# Patient Record
(demographics unavailable — no encounter records)

---

## 2025-04-03 NOTE — PHYSICAL EXAM
[Normal external genitalia] : normal external genitalia [Inguinal hernia] : inguinal hernia [TextBox_67] : Left inguinal hernia, ovary palpable in the hernia easily reduced.

## 2025-04-03 NOTE — CONSULT LETTER
[Dear  ___] : Dear  [unfilled], [Consult Letter:] : I had the pleasure of evaluating your patient, [unfilled]. [Consult Closing:] : Thank you very much for allowing me to participate in the care of this patient.  If you have any questions, please do not hesitate to contact me. [Sincerely,] : Sincerely, [FreeTextEntry2] : Devi Centeno MD [FreeTextEntry1] : She presents with an easily reducible left inguinal hernia.  The hernia has the ovary and intermittently but it is easily reduced as well.  I discussed the findings with the family in detail surgical repair and its associated risk complications and postoperative course.  Family is eager to proceed with surgery and we will schedule him at the earliest convenience for the family. [FreeTextEntry3] : Carmen Monreal MD

## 2025-04-03 NOTE — ASSESSMENT
[FreeTextEntry1] : Patient with newly diagnosed left inguinal hernia, hernia asymptomatic.  On exam the ovary is prolapsed and the hernia easily reduced without effort.  Findings were explained to the family.  Discussion of inguinal hernia repair was undertaken diagram was drawn.  Laparoscopic versus open repair was offered.  The risks of the procedure including bleeding infection injury to the underlying organs was related.  Also the presence of the ovary and the hernia and its being easily reduced were shared with the parents.  They wish to proceed with repair in a timely fashion and would like to get this scheduled to soon as possible.

## 2025-04-03 NOTE — HISTORY OF PRESENT ILLNESS
[FreeTextEntry1] : Patient presents for evaluation of a left inguinal hernia.  Parents report that they noticed a bulge about a week ago.  They were seen by the pediatrician evaluated and diagnosed with an inguinal hernia.  Patient has been otherwise asymptomatic, no vomiting or intractable pain.  Patient is nonverbal.

## 2025-04-28 NOTE — REASON FOR VISIT
[Father] : father [____ Week(s)] : [unfilled] week(s)  [Open inguinal hernia repair] : open inguinal hernia repair  [Pain] : ~He/She~ does not have pain [Tolerating Diet] : ~He/She~ is tolerating diet [Redness at incision] : ~He/She~ does not have redness at incision [Drainage at incision] : ~He/She~ does not have drainage at incision [Swelling at surgical site] : ~He/She~ does not have swelling at surgical site [de-identified] : 04/09/2025 [de-identified] : Jocelin [de-identified] : Her father reports she was fine the next day, playing actively and without any complaints of discomfort.

## 2025-04-28 NOTE — PHYSICAL EXAM
[TextBox_67] : Left inguinal incision well-healed no erythema or tenderness.  No evidence of bulge in the labia or at the inguinal ring.

## 2025-04-28 NOTE — ASSESSMENT
[FreeTextEntry1] : With uncomplicated left inguinal hernia.  Patient is recovered without any problems.  She is ready to resume her normal activities.